# Patient Record
Sex: MALE | Race: ASIAN | NOT HISPANIC OR LATINO | Employment: STUDENT | URBAN - METROPOLITAN AREA
[De-identification: names, ages, dates, MRNs, and addresses within clinical notes are randomized per-mention and may not be internally consistent; named-entity substitution may affect disease eponyms.]

---

## 2018-01-09 ENCOUNTER — OFFICE VISIT (OUTPATIENT)
Dept: INTERNAL MEDICINE | Facility: CLINIC | Age: 23
End: 2018-01-09
Payer: COMMERCIAL

## 2018-01-09 VITALS
OXYGEN SATURATION: 98 % | BODY MASS INDEX: 17.37 KG/M2 | WEIGHT: 110.7 LBS | DIASTOLIC BLOOD PRESSURE: 56 MMHG | HEART RATE: 84 BPM | TEMPERATURE: 98.5 F | SYSTOLIC BLOOD PRESSURE: 96 MMHG | HEIGHT: 67 IN

## 2018-01-09 DIAGNOSIS — J10.1 INFLUENZA A: ICD-10-CM

## 2018-01-09 DIAGNOSIS — J06.9 UPPER RESPIRATORY TRACT INFECTION, UNSPECIFIED TYPE: Primary | ICD-10-CM

## 2018-01-09 LAB
FLUAV+FLUBV AG SPEC QL: NEGATIVE
FLUAV+FLUBV AG SPEC QL: POSITIVE
SPECIMEN SOURCE: ABNORMAL

## 2018-01-09 PROCEDURE — 99214 OFFICE O/P EST MOD 30 MIN: CPT | Performed by: INTERNAL MEDICINE

## 2018-01-09 PROCEDURE — 87804 INFLUENZA ASSAY W/OPTIC: CPT | Performed by: INTERNAL MEDICINE

## 2018-01-09 RX ORDER — OSELTAMIVIR PHOSPHATE 75 MG/1
75 CAPSULE ORAL 2 TIMES DAILY
Qty: 10 CAPSULE | Refills: 0 | Status: SHIPPED | OUTPATIENT
Start: 2018-01-09 | End: 2018-10-03

## 2018-01-09 NOTE — PROGRESS NOTES
SUBJECTIVE:   Amilcar Barreto is a 23 year old male who presents to clinic today for the following health issues:    Patient is only been seen once here in the clinic and that was last 2-1/2 years ago.  He is accompanied by his family member states that she was ill last week and subsequently had her symptoms resolved.  The symptoms included fever chills dry cough diffuse muscle aches.    RESPIRATORY SYMPTOMS      Duration: Began yesterday    Description  rhinorrhea, fever (102 this morning), chills, headache, fatigue/malaise, hoarse voice and myalgias, he denies the presence of any sore throat    Severity: moderate    Accompanying signs and symptoms: dizziness this mornings    History (predisposing factors):  none    Precipitating or alleviating factors: None    Therapies tried and outcome:  acetaminophen    Problem list and histories reviewed & adjusted, as indicated.  Additional history: as documented    Patient Active Problem List   Diagnosis     Post-operative state     CARDIOVASCULAR SCREENING; LDL GOAL LESS THAN 160     Screening examination for venereal disease     Past Surgical History:   Procedure Laterality Date     LE FORT ONE , OSTEOTOMY SAGITTAL SPLIT, COMBINED  7/15/2014    Procedure: COMBINED LE FORT ONE, OSTEOTOMY SAGITTAL SPLIT;  Surgeon: Prashant Yoon MD;  Location: [facility] OR     OSTEOTOMY FEMUR PROXIMAL, SOFT TISSUE REPAIR BILATERAL CHILD, COMBINED       wisdom teeth removed         Social History   Substance Use Topics     Smoking status: Never Smoker     Smokeless tobacco: Not on file     Alcohol use No     Family History   Problem Relation Age of Onset     Family History Negative No family hx of          Current Outpatient Prescriptions   Medication Sig Dispense Refill     NASONEX 50 MCG/ACT NA SUSP INHALE 2 SPRAYS IN EACH NOSTRIL ONE DAILY 3 MONTHS 1 YEAR     FLOXIN OTIC 0.3 % OT SOLN 7 drops 3 times a day 7 days 0     CEFZIL 250 MG/5ML OR SUSR 1 tsp po bid for 10 days qs 0     Allergies  "  Allergen Reactions     Dust Mites      No Known Drug Allergies      BP Readings from Last 3 Encounters:   08/10/15 100/64   07/16/14 98/55   02/10/09 100/60    Wt Readings from Last 3 Encounters:   08/10/15 109 lb 9.6 oz (49.7 kg) (<1 %)*   07/15/14 111 lb 12.4 oz (50.7 kg) (3 %)*   02/10/09 83 lb 1.6 oz (37.7 kg) (33 %)*     * Growth percentiles are based on CDC 2-20 Years data.               Reviewed and updated as needed this visit by clinical staff     Reviewed and updated as needed this visit by Provider         ROS:  C: NEGATIVE for fever, chills, change in weight  E/M: NEGATIVE for ear, mouth and throat problems  R: NEGATIVE for significant cough or SOB  CV: NEGATIVE for chest pain, palpitations or peripheral edema  GI: NEGATIVE for nausea, abdominal pain, heartburn, or change in bowel habits  : NEGATIVE for frequency, dysuria, or hematuria  N: NEGATIVE for weakness, dizziness or paresthesias  H: NEGATIVE for bleeding problems  P: NEGATIVE for changes in mood or affect    OBJECTIVE:                                                    BP 96/56  Pulse 84  Temp 98.5  F (36.9  C) (Oral)  Ht 5' 7.25\" (1.708 m)  Wt 110 lb 11.2 oz (50.2 kg)  SpO2 98%  BMI 17.21 kg/m2  Body mass index is 17.21 kg/(m^2).  GENERAL:  alert and no distress  EYES: Eyes grossly normal to inspection, extraocular movements - intact, and PERRL  HENT: ear canals- normal; TMs- normal; Nose- normal; Mouth- no ulcers, no lesions  NECK: no tenderness, no adenopathy, no asymmetry, no masses, no stiffness; thyroid- normal to palpation  RESP: lungs clear to auscultation - no rales, no rhonchi, no wheezes  CV: regular rates and rhythm, normal S1 S2, no S3 or S4 and no click or rub   MS: extremities- no gross deformities noted, no edema  PSYCH: Alert and oriented times 3; speech- coherent , normal rate and volume; able to articulate logical thoughts, able to abstract reason, no tangential thoughts, no hallucinations or delusions, affect- " normal     ASSESSMENT/PLAN:                                                        (J06.9) Upper respiratory tract infection, unspecified type  (primary encounter diagnosis)  Comment: Symptoms appear as an acute viral syndrome but based on his history and the fact he has not received an influenza vaccine we will check for influenza accordingly.  Otherwise supportive care is been recommended.  The patient is opted to leave the clinic and I will contact him with results once they become available if in fact they returned positive for influenza.  Plan: Influenza A/B antigen    (J10.1) Influenza A  Comment: Addendum: Called and inform patient in regards to influenza A being positive prescription for Tamiflu was sent for 5 day course.  He was advised about precautions.  Plan: oseltamivir (TAMIFLU) 75 MG capsule            See Patient Instructions    Naga Ureña MD  [facility]    THE MEDICATION LIST HAS BEEN FULLY RECONCILED BY THE M.D. AND THE NURSING STAFF.    25 minutes spent with this patient, face to face, discussing treatment options for listed problems above as well as side effects of appropriate medications.  Counseling time extended beyond 50% of the clinic visit.  Medication dosing, treatment plan and follow-up were discussed. Also reviewed need for primary care testing for patient.

## 2018-01-09 NOTE — MR AVS SNAPSHOT
"              After Visit Summary   2018    Amilcar Barreto    MRN: [id number]           Patient Information     Date Of Birth          1995        Visit Information        Provider Department      2018 2:00 PM Naga Ureña MD [facility]        Today's Diagnoses     Upper respiratory tract infection, unspecified type    -  1       Follow-ups after your visit        Who to contact     If you have questions or need follow up information about today's clinic visit or your schedule please contact [facility] directly at [phone].  Normal or non-critical lab and imaging results will be communicated to you by [facility], letter or phone within 4 business days after the clinic has received the results. If you do not hear from us within 7 days, please contact the clinic through [facility] or phone. If you have a critical or abnormal lab result, we will notify you by phone as soon as possible.  Submit refill requests through [employer] or call your pharmacy and they will forward the refill request to us. Please allow 3 business days for your refill to be completed.          Additional Information About Your Visit        MyChart Information     [employer] lets you send messages to your doctor, view your test results, renew your prescriptions, schedule appointments and more. To sign up, go to [email or web address] . Click on \"Log in\" on the left side of the screen, which will take you to the Welcome page. Then click on \"Sign up Now\" on the right side of the page.     You will be asked to enter the access code listed below, as well as some personal information. Please follow the directions to create your username and password.     Your access code is: [email or web address]  Expires: 2018  2:25 PM     Your access code will  in 90 days. If you need help or a new code, please call your [facility] or [phone].        Care EveryWhere ID     This is your Care EveryWhere " "ID. This could be used by other organizations to access your [place] medical records  [id number]        Your Vitals Were     Pulse Temperature Height Pulse Oximetry BMI (Body Mass Index)       84 98.5  F (36.9  C) (Oral) 5' 7.25\" (1.708 m) 98% 17.21 kg/m2        Blood Pressure from Last 3 Encounters:   01/09/18 96/56   08/10/15 100/64   07/16/14 98/55    Weight from Last 3 Encounters:   01/09/18 110 lb 11.2 oz (50.2 kg)   08/10/15 109 lb 9.6 oz (49.7 kg) (<1 %)*   07/15/14 111 lb 12.4 oz (50.7 kg) (3 %)*     * Growth percentiles are based on [facility] 2-20 Years data.              We Performed the Following     Influenza A/B antigen        Primary Care Provider Office Phone # Fax #    [facility] [phone] [address] [id number]        Equal Access to Services     WILLIAM BRIONES : Hadii aad ku hadasho Soomaali, waaxda luqadaha, qaybta kaalmada adeegyaelier, justin patel . So [facility] [phone].    ATENCIÓN: Si habla español, tiene a gibbs disposición servicios gratuitos de asistencia lingüística. Llame al [phone].    We comply with applicable federal civil rights laws and [place] laws. We do not discriminate on the basis of race, color, national origin, age, disability, sex, sexual orientation, or gender identity.            Thank you!     Thank you for choosing [facility]  for your care. Our goal is always to provide you with excellent care. Hearing back from our patients is one way we can continue to improve our services. Please take a few minutes to complete the written survey that you may receive in the mail after your visit with us. Thank you!             Your Updated Medication List - Protect others around you: Learn how to safely use, store and throw away your medicines at www.disposemymeds.org.      Notice  As of 1/9/2018  2:25 PM    You have not been prescribed any medications.      "

## 2018-01-09 NOTE — NURSING NOTE
"Chief Complaint   Patient presents with     URI       Initial BP 96/56  Pulse 84  Temp 98.5  F (36.9  C) (Oral)  Ht 5' 7.25\" (1.708 m)  Wt 110 lb 11.2 oz (50.2 kg)  SpO2 98%  BMI 17.21 kg/m2 Estimated body mass index is 17.21 kg/(m^2) as calculated from the following:    Height as of this encounter: 5' 7.25\" (1.708 m).    Weight as of this encounter: 110 lb 11.2 oz (50.2 kg).  Medication Reconciliation: complete   Marilynn Marcano CMA      "

## 2018-10-03 ENCOUNTER — OFFICE VISIT (OUTPATIENT)
Dept: INTERNAL MEDICINE | Facility: CLINIC | Age: 23
End: 2018-10-03
Payer: COMMERCIAL

## 2018-10-03 VITALS
HEART RATE: 105 BPM | WEIGHT: 108.4 LBS | OXYGEN SATURATION: 97 % | DIASTOLIC BLOOD PRESSURE: 64 MMHG | SYSTOLIC BLOOD PRESSURE: 106 MMHG | TEMPERATURE: 98.6 F | BODY MASS INDEX: 16.85 KG/M2

## 2018-10-03 DIAGNOSIS — J02.9 ACUTE PHARYNGITIS, UNSPECIFIED ETIOLOGY: Primary | ICD-10-CM

## 2018-10-03 DIAGNOSIS — J02.9 SORE THROAT: ICD-10-CM

## 2018-10-03 LAB
DEPRECATED S PYO AG THROAT QL EIA: NORMAL
SPECIMEN SOURCE: NORMAL

## 2018-10-03 PROCEDURE — 87880 STREP A ASSAY W/OPTIC: CPT | Performed by: PHYSICIAN ASSISTANT

## 2018-10-03 PROCEDURE — 99213 OFFICE O/P EST LOW 20 MIN: CPT | Performed by: PHYSICIAN ASSISTANT

## 2018-10-03 PROCEDURE — 87081 CULTURE SCREEN ONLY: CPT | Performed by: PHYSICIAN ASSISTANT

## 2018-10-03 NOTE — PATIENT INSTRUCTIONS
If continued sore throat into next week then call would repeat rapid strep testing ( lab only)  Fluids  Salt water gargles.  Advil

## 2018-10-03 NOTE — PROGRESS NOTES
SUBJECTIVE:   Amilcar Barreto is a 23 year old male who presents to clinic today with Self because of:    Chief Complaint   Patient presents with     Pharyngitis        HPI  ENT/Cough Symptoms    Problem started: 2 days ago  Fever: no  Runny nose: no  Congestion: no  Sore Throat: YES  Cough: no  Eye discharge/redness:  no  Ear Pain: no  Wheeze: no   Sick contacts: Family member (Parents);  Strep exposure: Family member (Parents);  Therapies Tried:     Body aches             ROS  Constitutional, eye, ENT, skin, respiratory, cardiac, and GI are normal except as otherwise noted.    PROBLEM LIST  Patient Active Problem List    Diagnosis Date Noted     CARDIOVASCULAR SCREENING; LDL GOAL LESS THAN 160 08/10/2015     Priority: Medium     Screening examination for venereal disease 08/10/2015     Priority: Medium     Post-operative state 07/15/2014     Priority: Medium      MEDICATIONS  Current Outpatient Prescriptions   Medication Sig Dispense Refill     oseltamivir (TAMIFLU) 75 MG capsule Take 1 capsule (75 mg) by mouth 2 times daily (Patient not taking: Reported on 10/3/2018) 10 capsule 0      ALLERGIES  Allergies   Allergen Reactions     Dust Mites      No Known Drug Allergies        Reviewed and updated as needed this visit by clinical staff  Allergies  Meds         Reviewed and updated as needed this visit by Provider  Allergies  Meds       OBJECTIVE:     /64  Pulse 105  Temp 98.6  F (37  C) (Oral)  Wt 108 lb 6.4 oz (49.2 kg)  SpO2 97%  BMI 16.85 kg/m2  Normalized stature-for-age data not available for patients older than 20 years.  Normalized weight-for-age data not available for patients older than 20 years.  Normalized BMI data available only for age 0 to 20 years.  Normalized stature-for-age data not available for patients older than 20 years.    GENERAL: Active, alert, in no acute distress.  EARS: Normal canals. Tympanic membranes are normal; gray and translucent.  NOSE: Normal without  discharge.  MOUTH/THROAT: mild erythema on the posterior pharynx , no tonsillar exudates and no tonsillar hypertrophy  NECK: Supple, no masses.  LYMPH NODES: No adenopathy  LUNGS: Clear. No rales, rhonchi, wheezing or retractions  HEART: regular rate and rhythm    DIAGNOSTICS:     Results for orders placed or performed in visit on 10/03/18 (from the past 24 hour(s))   Strep, Rapid Screen   Result Value Ref Range    Specimen Description Throat     Rapid Strep A Screen       NEGATIVE: No Group A streptococcal antigen detected by immunoassay, await culture report.       ASSESSMENT/PLAN:     1. Acute pharyngitis, unspecified etiology    2. Sore throat        FOLLOW UP:   Patient Instructions   If continued sore throat into next week then call would repeat rapid strep testing ( lab only)  Fluids  Salt water gargles.  Lavon Henry PA-C

## 2018-10-03 NOTE — MR AVS SNAPSHOT
"              After Visit Summary   10/3/2018    Amilcar Barreto    MRN: [id number]           Patient Information     Date Of Birth          1995        Visit Information        Provider Department      10/3/2018 4:00 PM Bree Henyr PA-C [facility]        Today's Diagnoses     Sore throat    -  1    Acute pharyngitis, unspecified etiology          Care Instructions    If continued sore throat into next.   Fluids  Salt water gargles.  Advil           Follow-ups after your visit        Who to contact     If you have questions or need follow up information about today's clinic visit or your schedule please contact [facility] directly at [phone].  Normal or non-critical lab and imaging results will be communicated to you by MyChart, letter or phone within 4 business days after the clinic has received the results. If you do not hear from us within 7 days, please contact the clinic through MyChart or phone. If you have a critical or abnormal lab result, we will notify you by phone as soon as possible.  Submit refill requests through [employer] or call your pharmacy and they will forward the refill request to us. Please allow 3 business days for your refill to be completed.          Additional Information About Your Visit        MyChart Information     [employer] lets you send messages to your doctor, view your test results, renew your prescriptions, schedule appointments and more. To sign up, go to [email or web address] . Click on \"Log in\" on the left side of the screen, which will take you to the Welcome page. Then click on \"Sign up Now\" on the right side of the page.     You will be asked to enter the access code listed below, as well as some personal information. Please follow the directions to create your username and password.     Your access code is: [email or web address]  Expires: 2019  4:30 PM     Your access code will  in 90 days. If you need help or a " new code, please call your [place] clinic or [phone].        Care EveryWhere ID     This is your Care EveryWhere ID. This could be used by other organizations to access your [place] medical records  [id number]        Your Vitals Were     Pulse Temperature Pulse Oximetry BMI (Body Mass Index)          105 98.6  F (37  C) (Oral) 97% 16.85 kg/m2         Blood Pressure from Last 3 Encounters:   10/03/18 106/64   01/09/18 96/56   08/10/15 100/64    Weight from Last 3 Encounters:   10/03/18 108 lb 6.4 oz (49.2 kg)   01/09/18 110 lb 11.2 oz (50.2 kg)   08/10/15 109 lb 9.6 oz (49.7 kg) (<1 %)*     * Growth percentiles are based on [facility] 2-20 Years data.              We Performed the Following     Beta strep group A culture     Strep, Rapid Screen        Primary Care Provider Office Phone # Fax #    [facility] [phone]       [address] [place] [phone]        Equal Access to Services     WILLIAM BRIONES : Hadii aad ku hadasho Soomaali, waaxda luqadaha, qaybta kaalmada adeegyaelier, justin patel . So [facility] [phone].    ATENCIÓN: Si habla español, tiene a gibbs disposición servicios gratuitos de asistencia lingüística. Suellen al [phone].    We comply with applicable federal civil rights laws and [place] laws. We do not discriminate on the basis of race, color, national origin, age, disability, sex, sexual orientation, or gender identity.            Thank you!     Thank you for choosing [facility]  for your care. Our goal is always to provide you with excellent care. Hearing back from our patients is one way we can continue to improve our services. Please take a few minutes to complete the written survey that you may receive in the mail after your visit with us. Thank you!             Your Updated Medication List - Protect others around you: Learn how to safely use, store and throw away your medicines at  www.disposemymeds.org.      Notice  As of 10/3/2018  4:30 PM    You have not been prescribed any medications.

## 2018-10-04 LAB
BACTERIA SPEC CULT: NORMAL
SPECIMEN SOURCE: NORMAL

## 2022-04-30 ENCOUNTER — TELEPHONE (OUTPATIENT)
Dept: NURSING | Facility: CLINIC | Age: 27
End: 2022-04-30

## 2022-04-30 ENCOUNTER — HOSPITAL ENCOUNTER (EMERGENCY)
Facility: CLINIC | Age: 27
Discharge: HOME | End: 2022-04-30
Attending: EMERGENCY MEDICINE | Admitting: EMERGENCY MEDICINE

## 2022-04-30 VITALS
HEART RATE: 84 BPM | SYSTOLIC BLOOD PRESSURE: 123 MMHG | RESPIRATION RATE: 16 BRPM | OXYGEN SATURATION: 99 % | TEMPERATURE: 98.2 F | DIASTOLIC BLOOD PRESSURE: 86 MMHG

## 2022-04-30 DIAGNOSIS — Z20.822 SUSPECTED 2019 NOVEL CORONAVIRUS INFECTION: ICD-10-CM

## 2022-04-30 LAB
FLUAV RNA SPEC QL NAA+PROBE: NEGATIVE
FLUBV RNA RESP QL NAA+PROBE: NEGATIVE
RSV RNA SPEC NAA+PROBE: NEGATIVE
SARS-COV-2 RNA RESP QL NAA+PROBE: POSITIVE

## 2022-04-30 PROCEDURE — 99283 EMERGENCY DEPT VISIT LOW MDM: CPT

## 2022-04-30 PROCEDURE — C9803 HOPD COVID-19 SPEC COLLECT: HCPCS

## 2022-04-30 PROCEDURE — 87637 SARSCOV2&INF A&B&RSV AMP PRB: CPT | Performed by: EMERGENCY MEDICINE

## 2022-04-30 NOTE — DISCHARGE INSTRUCTIONS
Given your close contact with someone who is COVID-19 I would recommend that you assume you have COVID-19 and retest if this test returns negative.  Please stay quarantine.  Wear tight fitting mask.  Wash hands frequently at home.  Return with any acute changes.    Discharge Instructions  COVID-19    COVID-19 is the disease caused by a new coronavirus. The virus spreads from person-to-person primarily by droplets when an infected person coughs or sneezes and the droplets are then breathed in by another person.    Symptoms of COVID-19  Many people have no symptoms or mild symptoms.  Symptoms usually appear within a few days, but up to 14-days, after contact with a person with COVID-19.    A mild COVID-19 illness is like a cold and can have fever, cough, sneezing, sore throat, tiredness, headache, and muscle pain.    A moderate COVID-19 illness might include shortness of breath or pneumonia on a chest x-ray.    A severe COVID-19 illness causes significant breathing problems such as low oxygen levels or more serious pneumonia.  Some patients experience loss of taste or smell which is somewhat unique to COVID-19.      Isolation and Quarantine  Testing is recommended for any person with symptoms that could be COVID-19 and often for those exposed to COVID-19. The best way to stop the spread of the virus is to avoid contact with others.    A close contact exposure is being within 6 feet of someone with COVID for 15 minutes.    Isolation refers to sick people staying away from people who are not sick.    A person in quarantine is limiting activity because they were exposed and are waiting to see if they might become sick.    If you test positive for COVID and have no symptoms, you should stay home (isolation) for 5 full days after the day of the test. You should then wear a mask when around others for another 5 days.    If you test positive for COVID and have mild symptoms, you should stay home (isolation) for at least 5  days after your symptoms began. You can return to normal activities at that time, wearing a mask when around others, for another 5 days as long as your symptoms are improving/resolving and you have been without a fever for 24 hours (without using fever-reducing medicine).    If you test positive for COVID and have more than mild symptoms, you should stay home (isolation) for at least 10 days after your symptoms began. You can return to normal activities at that time as long as your symptoms are improving and you have been without a fever for 24 hours (without using fever-reducing medicine).  For example, if you have a fever and cough for 6 days, you need to stay home 4 more days with no fever for a total of 10 days. Or, if you have a fever and cough for 10 days, you need to stay home one more day with no fever for a total of 11 days.    If you were exposed to COVID and are not vaccinated (or it has been more than six months from your Pfizer or Moderna vaccine or two months from J&J vaccine), you should stay home (quarantine) for 5 days and then wear a mask around others for 5 additional days. A COVID test at day 5 is recommended.    If you were exposed to COVID and are vaccinated (had a booster, had two shots of Pfizer or Moderna vaccine in the last five months, or had J&J vaccine within two months), you do not need to quarantine but should wear a mask around others for 10 days and get a COVID test on day 5.    If you have symptoms but a negative test, you should stay at home until you have mild/improving symptoms and are without fever for 24 hours, using the same judgment you would for when it is safe to return to work/school from strep throat, influenza, or the common cold. If you worsen, you should consider being re-evaluated.    If you are being tested for COVID because of symptoms and your test is pending, you should stay home until you know your test result.  More details on isolation and quarantine can be  found on this website from the CDC:  https://www.cdc.gov/coronavirus/2019-ncov/your-health/quarantine-isolation.html    If I have COVID, how should I protect myself and others?    Do not go to work or school. Have a friend or relative do your shopping. Do not use public transportation (bus, train) or ridesharing (Lyft, Uber).    Separate yourself from other people in your home. As much as possible, you should stay in one room and away from other people in your home. Also, use a separate bathroom, if possible. Avoid handling pets or other animals while sick.     Wear a facemask if you need to be around other people and cover your mouth and nose with a tissue when you cough or sneeze.     Avoid sharing personal household items. You should not share dishes, drinking glasses, forks/knives/spoons, towels, or bedding with other people in your home. After using these items, they should be washed with soap and water. Clean parts of your home that are touched often (doorknobs, faucets, countertops, etc.) daily.     Wash your hands often with soap and water for at least 20 seconds or use an alcohol-based hand  containing at least 60% alcohol.     Avoid touching your face.    Treat your symptoms. You can take Acetaminophen (Tylenol) to treat body aches and fever as needed for comfort. Ibuprofen (Advil or Motrin) can be used as well if you still have symptoms after taking Tylenol. Drink fluids. Rest.    Watch for worsening symptoms such as shortness of breath/difficulty breathing or very severe weakness.    Employers/workplaces are being asked by the Centers for Disease Control (CDC) to not request notes/documentation for you to return to work or prove that you were ill. You may choose to show your employer this paperwork. Also, repeat testing should not be required to return to work.    Exercise/Sports in rare cases, COVID could affect your heart in a way that makes exercise or participation in sports dangerous.  If  you have a mild COVID illness (fever, cough, sore throat, and similar symptoms but no difficulty breathing or abnormalities of the lung): After your COVID symptoms have resolved, wait 14-days before returning to activity.  If you have more than a mild illness (meaning that you have problems with your breathing or lungs) or if you participate in competitive or strenuous activity or have a history of heart disease: Please see your primary doctor/provider prior to return to activity/competition.    Antibody treatments are available for patients with mild to moderate COVID illness in order to prevent severe illness. In general, only patients with risk factors for severe illness are eligible for treatment. For more information, to see if you are eligible, and to find treatment, go to the [place] [facility]:    [email or web address]://www.[email or web address].[place]/diseases/coronavirus/mnrap.html     Return to the Emergency Department if:    If you are developing worsening breathing, shortness of breath, or feel worse you should seek medical attention.  If you are uncertain, contact your health care provider/clinic. If you need emergency medical attention, call 911 and tell them you have been ill.

## 2022-04-30 NOTE — ED TRIAGE NOTES
Pt exposed to family member who recently tested COVID+. PT denies symptoms at this time.    ABCs intact A&Ox4     Triage Assessment     Row Name 04/30/22 3407       Triage Assessment (Adult)    Airway WDL WDL

## 2022-04-30 NOTE — TELEPHONE ENCOUNTER
Pt stated he believes he tested positive due to having the vaccine on Wednesday Apr 27th, he had received the booster at this time. Has had zero symptoms.       Coronavirus (COVID-19) Notification    Caller Name (Patient, parent, daughter/son, grandparent, etc)  Pt    Reason for call  Notify of Positive Coronavirus (COVID-19) lab results, assess symptoms,  review [facility] recommendations    Lab Result    Lab test:  2019-nCoV rRt-PCR or SARS-CoV-2 PCR    Oropharyngeal AND/OR nasopharyngeal swabs is POSITIVE for 2019-nCoV RNA/SARS-COV-2 PCR (COVID-19 virus)      Gather patient reported symptoms   Assessment   Current Symptoms at time of phone call, reported by patient: (if no symptoms, document: No symptoms] none   Date of symptom(s) onset (if applicable) n/a     If at time of call, Patients symptoms have worsened, the Patient should contact 911 or have someone drive them to Emergency Dept promptly:      If Patient calling 911, inform 911 personal that you have tested positive for the Coronavirus (COVID-19).  Place mask on and await 911 to arrive.    If Emergency Dept, If possible, please have another adult drive you to the Emergency Dept but you need to wear mask when in contact with other people.      Treatment Options:   Patient classified as COVID treatment eligible by Epic high risk algorithm: Yes  Is the patient symptomatic at the time of result notification? No    Review information with Patient    Your result was positive. This means you have COVID-19 (coronavirus).    How can I protect others?    These guidelines are for isolating before returning to work, school or .    If you DO have symptoms    Stay home and away from others     For at least 5 days after your symptoms started, AND    You are fever free for 24 hours (with no medicine that reduces fever), AND    Your other symptoms are better    Wear a mask for 10 full days anytime you are around others    If you DON'T have symptoms    Stay  home and away from others for at least 5 days after your positive test    Wear a mask for 10 full days anytime you are around others    There may be different guidelines for healthcare facilities.  Please check with the specific sites before arriving.    If you have been told by a doctor that you were severely ill with COVID-19 or are immunocompromised, you should isolate for at least 10 days.    You should not go back to work until you meet the guidelines above for ending your home isolation. You don't need to be retested for COVID-19 before going back to work--studies show that you won't spread the virus if it's been at least 10 days since your symptoms started (or 20 days, if you have a weak immune system).    Employers, schools, and daycares: This is an official notice for this person's medical guidelines for returning in-person.  They must meet the above guidelines before going back to work, school or  in person.    You will receive a positive COVID-19 letter via [employer] or the mail soon with additional self-care information (exception, no letters will be sent to presurgical/preprocedure patients).    Would you like me to review some of that information with you now?  No    If you were tested for an upcoming procedure, please contact your provider for next steps.    Coty Vidal

## 2022-04-30 NOTE — ED PROVIDER NOTES
History     Chief Complaint:    Covid 19 Testing      HPI   Amilcar Barreto is a 27 year old male who presents with covid test.  Patient is the grandson of a patient in the ER who was diagnosed with COVID-19.  He has been vaccinated against COVID-19.  He is currently asymptomatic but wishes to be tested.  He denies chest pain, shortness breath, weakness numbness tingling, nausea vomiting or other concerns.    Review of Systems   All other systems reviewed and are negative.    Allergies:    Dust Mites  No Known Drug Allergies      Medications:      No current outpatient medications on file.      Past Medical History:      No past medical history on file.  Patient Active Problem List    Diagnosis Date Noted     CARDIOVASCULAR SCREENING; LDL GOAL LESS THAN 160 08/10/2015     Priority: Medium     Screening examination for venereal disease 08/10/2015     Priority: Medium     Post-operative state 07/15/2014     Priority: Medium        Past Surgical History:      Past Surgical History:   Procedure Laterality Date     LE FORT ONE , OSTEOTOMY SAGITTAL SPLIT, COMBINED  7/15/2014    Procedure: COMBINED LE FORT ONE, OSTEOTOMY SAGITTAL SPLIT;  Surgeon: Prashant Yoon MD;  Location: [facility] OR     OSTEOTOMY FEMUR PROXIMAL, SOFT TISSUE REPAIR BILATERAL CHILD, COMBINED       wisdom teeth removed         Family History:      Family History   Problem Relation Age of Onset     Family History Negative No family hx of        Social History:  Presents with his grandparents whom he lives with    Physical Exam     Patient Vitals for the past 24 hrs:   BP Temp Temp src Pulse Resp SpO2   04/30/22 1643 123/86 98.2  F (36.8  C) Oral 84 16 99 %       Physical Exam  General:  Standing, comfortable appearing.   Head:  No obvious trauma to head  Ears, Nose:  External ears normal.  Nose normal.  clear oral pharynx   Eyes:  Conjunctivae clear.  Pupils round and symmetry.    Neck:  Normal range of motion. Neck supple and symmetric.    Pulm/Chest:  No  respiratory distress.  Breathing comfortably.    CV: Regular rate and rhythm.    MSK:  Normal range of motion.   Neuro:  Alert. Moving all extremities.    Skin:  Skin is warm and dry.        Emergency Department Course     Laboratory:  Labs Ordered and Resulted from Time of ED Arrival to Time of ED Departure - No data to display    Procedures:      Emergency Department Course:             Reviewed:  I reviewed nursing notes, vitals and past medical history    Assessments:  1620 I obtained history and examined the patient as noted above.       Interventions:  Medications - No data to display    Disposition:  The patient was discharged to home.     Impression & Plan      Medical Decision Makin-year-old male otherwise healthy presents with COVID-19 exposure.  Broad differential was pursued including but not limited to COVID-19 testing, influenza, dehydration, pneumonia, pneumothorax, effusion, etc.  Patient's well-appearing nontoxic.  Clear lung sounds, no signs acute pneumonia, pneumothorax or effusion.  No hypoxia.  COVID-19 and influenza testing pending.  Patient eating and drinking well.  Otherwise asymptomatic.  Patient prefers to have testing and discharge.  They will follow-up in their MyChart.  Discussed possible retesting if end up being negative.  Discussed wearing a mask and washing hands while at home.  Close follow-up advised.  Return precautions provided.      Covid-19  Amilcar Barreto was evaluated during a global COVID-19 pandemic, which necessitated consideration that the patient might be at risk for infection with the SARS-CoV-2 virus that causes COVID-19.   Applicable protocols for evaluation were followed during the patient's care.   COVID-19 was considered as part of the patient's evaluation.    Diagnosis:    ICD-10-CM    1. Suspected 2019 novel coronavirus infection  Z20.822        Discharge Medications:  New Prescriptions    No medications on file          Fatimah Park MD  22  [phone]

## 2022-05-07 ENCOUNTER — HEALTH MAINTENANCE LETTER (OUTPATIENT)
Age: 27
End: 2022-05-07

## 2022-12-26 ENCOUNTER — HEALTH MAINTENANCE LETTER (OUTPATIENT)
Age: 27
End: 2022-12-26

## 2023-06-02 ENCOUNTER — HEALTH MAINTENANCE LETTER (OUTPATIENT)
Age: 28
End: 2023-06-02

## 2023-07-13 ASSESSMENT — ENCOUNTER SYMPTOMS
HEADACHES: 0
NERVOUS/ANXIOUS: 0
HEMATURIA: 0
DIARRHEA: 0
MYALGIAS: 0
SORE THROAT: 0
FEVER: 0
EYE PAIN: 0
DYSURIA: 0
ARTHRALGIAS: 0
PARESTHESIAS: 0
DIZZINESS: 0
NAUSEA: 0
WEAKNESS: 0
JOINT SWELLING: 0
CHILLS: 0
PALPITATIONS: 0
FREQUENCY: 0
HEMATOCHEZIA: 0
COUGH: 0
CONSTIPATION: 0
ABDOMINAL PAIN: 0
HEARTBURN: 0
SHORTNESS OF BREATH: 0

## 2023-07-19 ENCOUNTER — OFFICE VISIT (OUTPATIENT)
Dept: INTERNAL MEDICINE | Facility: CLINIC | Age: 28
End: 2023-07-19
Payer: COMMERCIAL

## 2023-07-19 VITALS
WEIGHT: 121.2 LBS | BODY MASS INDEX: 18.37 KG/M2 | HEIGHT: 68 IN | TEMPERATURE: 97.7 F | HEART RATE: 73 BPM | OXYGEN SATURATION: 99 % | RESPIRATION RATE: 17 BRPM | SYSTOLIC BLOOD PRESSURE: 104 MMHG | DIASTOLIC BLOOD PRESSURE: 60 MMHG

## 2023-07-19 DIAGNOSIS — Z00.00 ANNUAL PHYSICAL EXAM: Primary | ICD-10-CM

## 2023-07-19 DIAGNOSIS — L65.9 HAIR LOSS: ICD-10-CM

## 2023-07-19 DIAGNOSIS — Z11.3 SCREENING EXAMINATION FOR VENEREAL DISEASE: ICD-10-CM

## 2023-07-19 DIAGNOSIS — Z13.220 SCREENING FOR HYPERLIPIDEMIA: ICD-10-CM

## 2023-07-19 DIAGNOSIS — Z11.59 NEED FOR HEPATITIS C SCREENING TEST: ICD-10-CM

## 2023-07-19 DIAGNOSIS — Z11.4 SCREENING FOR HIV (HUMAN IMMUNODEFICIENCY VIRUS): ICD-10-CM

## 2023-07-19 PROCEDURE — 99213 OFFICE O/P EST LOW 20 MIN: CPT | Mod: 25 | Performed by: INTERNAL MEDICINE

## 2023-07-19 PROCEDURE — 99385 PREV VISIT NEW AGE 18-39: CPT | Performed by: INTERNAL MEDICINE

## 2023-07-19 ASSESSMENT — ENCOUNTER SYMPTOMS
JOINT SWELLING: 0
ARTHRALGIAS: 0
SHORTNESS OF BREATH: 0
FREQUENCY: 0
MYALGIAS: 0
ABDOMINAL PAIN: 0
COUGH: 0
WEAKNESS: 0
CONSTIPATION: 0
NAUSEA: 0
PARESTHESIAS: 0
CHILLS: 0
DIARRHEA: 0
PALPITATIONS: 0
HEMATOCHEZIA: 0
FEVER: 0
HEARTBURN: 0
SORE THROAT: 0
EYE PAIN: 0
HEADACHES: 0
DYSURIA: 0
HEMATURIA: 0
DIZZINESS: 0
NERVOUS/ANXIOUS: 0

## 2023-07-19 NOTE — NURSING NOTE
"/60   Pulse 73   Temp 97.7  F (36.5  C) (Tympanic)   Resp 17   Ht 1.727 m (5' 8\")   Wt 55 kg (121 lb 3.2 oz)   SpO2 99%   BMI 18.43 kg/m      "

## 2023-07-19 NOTE — PROGRESS NOTES
"SUBJECTIVE:   CC: Amilcar is an 28 year old who presents for preventative health visit.       7/19/2023     1:14 PM   Additional Questions   Roomed by karli   Accompanied by self         7/19/2023     1:14 PM   Patient Reported Additional Medications   Patient reports taking the following new medications none     Patient is a 28-year-old male who presents to the clinic for his annual physical.  His main concern today is in regards to hair loss.  Patient states that he has noticed some hair loss over the past several years, but it seems to have gotten substantially more prominent over the past 1 to 2 years.  Patient states that he is \"getting quite thin on the top.\"  He has not noted any hair loss anywhere else on his body.  Patient does not currently take any medications.  He denies any issues with change in appetite.  Patient is reporting normal bowel and bladder habits.  He did recently purchase some over-the-counter minoxidil drops to see if that would help with his issues with his hair loss.  Otherwise, patient has no other major concerns or issues to address today.  He is not fasting.  Patient reports the only family history is aware of his diabetes in his maternal grandfather.    Healthy Habits:     Getting at least 3 servings of Calcium per day:  NO    Bi-annual eye exam:  Yes    Dental care twice a year:  Yes    Sleep apnea or symptoms of sleep apnea:  None    Diet:  Regular (no restrictions)    Frequency of exercise:  2-3 days/week    Duration of exercise:  45-60 minutes    Taking medications regularly:  Yes    Medication side effects:  None    Additional concerns today:  No      Today's PHQ-2 Score:       7/19/2023     1:10 PM   PHQ-2 ( 1999 [employer])   Q1: Little interest or pleasure in doing things 0   Q2: Feeling down, depressed or hopeless 0   PHQ-2 Score 0   Q1: Little interest or pleasure in doing things Not at all   Q2: Feeling down, depressed or hopeless Not at all   PHQ-2 Score 0       Have you ever " "done Advance Care Planning? (For example, a Health Directive, POLST, or a discussion with a medical provider or your loved ones about your wishes): No, advance care planning information given to patient to review.  Patient declined advance care planning discussion at this time.    Social History     Tobacco Use     Smoking status: Never     Smokeless tobacco: Never   Substance Use Topics     Alcohol use: Yes     Alcohol/week: 0.0 standard drinks of alcohol     Comment: socially            7/13/2023     7:47 PM   Alcohol Use   Prescreen: >3 drinks/day or >7 drinks/week? No          No data to display                Last PSA: No results found for: PSA    Reviewed orders with patient. Reviewed health maintenance and updated orders accordingly - Yes  Lab work is in process    Reviewed and updated as needed this visit by clinical staff   Tobacco                Reviewed and updated as needed this visit by Provider                     Review of Systems   Constitutional: Negative for chills and fever.   HENT: Negative for congestion, ear pain, hearing loss and sore throat.    Eyes: Negative for pain and visual disturbance.   Respiratory: Negative for cough and shortness of breath.    Cardiovascular: Negative for chest pain, palpitations and peripheral edema.   Gastrointestinal: Negative for abdominal pain, constipation, diarrhea, heartburn, hematochezia and nausea.   Genitourinary: Negative for dysuria, frequency, genital sores, hematuria, impotence, penile discharge and urgency.   Musculoskeletal: Negative for arthralgias, joint swelling and myalgias.   Skin: Negative for rash.   Neurological: Negative for dizziness, weakness, headaches and paresthesias.   Psychiatric/Behavioral: Negative for mood changes. The patient is not nervous/anxious.        OBJECTIVE:   Blood pressure 104/60, pulse 73, temperature 97.7  F (36.5  C), temperature source Tympanic, resp. rate 17, height 1.727 m (5' 8\"), weight 55 kg (121 lb 3.2 oz), " SpO2 99 %.    Physical Exam  Vitals reviewed.   HENT:      Head: Normocephalic and atraumatic.      Right Ear: Tympanic membrane, ear canal and external ear normal.      Left Ear: Tympanic membrane, ear canal and external ear normal.      Mouth/Throat:      Mouth: Mucous membranes are moist.      Pharynx: Oropharynx is clear.   Eyes:      Extraocular Movements: Extraocular movements intact.      Conjunctiva/sclera: Conjunctivae normal.      Pupils: Pupils are equal, round, and reactive to light.   Cardiovascular:      Rate and Rhythm: Normal rate and regular rhythm.      Pulses: Normal pulses.      Heart sounds: Normal heart sounds.   Pulmonary:      Effort: Pulmonary effort is normal.      Breath sounds: Normal breath sounds.   Abdominal:      General: Abdomen is flat. Bowel sounds are normal.      Palpations: Abdomen is soft.   Musculoskeletal:         General: Normal range of motion.      Cervical back: Normal range of motion and neck supple.   Skin:     General: Skin is warm and dry.      Capillary Refill: Capillary refill takes less than 2 seconds.      Comments: Diffuse thinning of hair noted over scalp.   Neurological:      General: No focal deficit present.      Mental Status: He is alert and oriented to person, place, and time.       Diagnostic Test Results: CMP, CBC, FLP, TSH, HIV screening, hepatitis C screening, and iron studies are pending.    ASSESSMENT/PLAN:   (Z00.00) Annual physical exam  (primary encounter diagnosis)  Comment: At this time, patient does have a relatively unremarkable physical examination.  His blood pressure is noted to be at an acceptable level.  We did spend some time discussing appropriate dietary and lifestyle modifications necessary to help keep his weight and blood pressure under good control.  Patient will return at a later time for fasting lab collection.  All health maintenance items were addressed.    (Z11.4) Screening for HIV (human immunodeficiency virus)  Comment: HIV  Antigen Antibody Combo is pending.    (Z11.59) Need for hepatitis C screening test  Comment: Hepatitis C Screen Reflex to HCV RNA Quant and         Genotype is pending.    (L65.9) Hair loss  Comment: Patient does have noticeable thinning of his hair on his scalp.  We did elect to evaluate for any issues with iron deficiency and thyroid disease that could be contributing to his issues with hair loss.  He will have a TSH, ferritin, and iron studies collected when he returns for fasting labs.  If there are no abnormalities noted on his blood work, we did briefly discuss possibility of proceeding with a trial of Propecia.    (Z13.220) Screening for hyperlipidemia  Comment: Lipid panel reflex to direct LDL Fasting is pending.      Patient has been advised of split billing requirements and indicates understanding: Yes      COUNSELING:   Reviewed preventive health counseling, as reflected in patient instructions        He reports that he has never smoked. He has never used smokeless tobacco.      Jake Rogers MD  [facility]

## 2024-09-01 ENCOUNTER — HEALTH MAINTENANCE LETTER (OUTPATIENT)
Age: 29
End: 2024-09-01